# Patient Record
Sex: FEMALE | Race: WHITE | NOT HISPANIC OR LATINO | Employment: FULL TIME | URBAN - METROPOLITAN AREA
[De-identification: names, ages, dates, MRNs, and addresses within clinical notes are randomized per-mention and may not be internally consistent; named-entity substitution may affect disease eponyms.]

---

## 2020-09-15 ENCOUNTER — NURSE TRIAGE (OUTPATIENT)
Dept: OTHER | Facility: OTHER | Age: 30
End: 2020-09-15

## 2020-09-15 DIAGNOSIS — J12.81 SARS (SEVERE ACUTE RESPIRATORY SYNDROME): Primary | ICD-10-CM

## 2020-09-15 NOTE — TELEPHONE ENCOUNTER
Reason for Disposition   [1] COVID-19 EXPOSURE (Close Contact) AND [2] within last 14 days BUT [3] NO symptoms    Answer Assessment - Initial Assessment Questions  1  CLOSE CONTACT: "Who is the person with the confirmed or suspected COVID-19 infection that you were exposed to?"      Co worker  2  PLACE of CONTACT: "Where were you when you were exposed to COVID-19?" (e g , home, school, medical waiting room; which city?)     [facility]  3  TYPE of CONTACT: "How much contact was there?" (e g , sitting next to, live in same house, work in same office, same building)      unsure  4  DURATION of CONTACT: "How long were you in contact with the COVID-19 patient?" (e g , a few seconds, passed by person, a few minutes, live with the patient)      8 hrs  5  DATE of CONTACT: "When did you have contact with a COVID-19 patient?" (e g , how many days ago)      9/8  6  TRAVEL: "Have you traveled out of the country recently?" If so, "When and where?"      * Also ask about out-of-state travel, since the CDC has identified some high-risk cities for community spread in the [address]  * Note: Travel becomes less relevant if there is widespread community transmission where the patient lives  no  7  COMMUNITY SPREAD: "Are there lots of cases of COVID-19 (community spread) where you live?" (See public health department website, if unsure)        Yes  8  SYMPTOMS: "Do you have any symptoms?" (e g , fever, cough, breathing difficulty)      headache  9  PREGNANCY OR POSTPARTUM: "Is there any chance you are pregnant?" "When was your last menstrual period?" "Did you deliver in the last 2 weeks?"      No, 8/20/20  10  HIGH RISK: "Do you have any heart or lung problems?  Do you have a weak immune system?" (e g , CHF, COPD, asthma, HIV positive, chemotherapy, renal failure, diabetes mellitus, sickle cell anemia)        no    Protocols used: CORONAVIRUS (COVID-19) EXPOSURE-ADULT-  pt had exposure to covid and wants to be tested , no symptoms, care advice given and agreed upon   Order placed

## 2020-09-15 NOTE — TELEPHONE ENCOUNTER
Regarding: [id number]   ----- Message from Rosendo Nagel sent at 9/15/2020  7:18 PM EDT -----  possible exposure Positive Covid-19 Coworker wants to get tested please call cell number

## 2021-01-01 ENCOUNTER — NURSE TRIAGE (OUTPATIENT)
Dept: OTHER | Facility: OTHER | Age: 31
End: 2021-01-01

## 2021-01-01 DIAGNOSIS — Z20.822 EXPOSURE TO COVID-19 VIRUS: Primary | ICD-10-CM

## 2021-01-01 DIAGNOSIS — Z20.822 EXPOSURE TO COVID-19 VIRUS: ICD-10-CM

## 2021-01-01 PROCEDURE — U0003 INFECTIOUS AGENT DETECTION BY NUCLEIC ACID (DNA OR RNA); SEVERE ACUTE RESPIRATORY SYNDROME CORONAVIRUS 2 (SARS-COV-2) (CORONAVIRUS DISEASE [COVID-19]), AMPLIFIED PROBE TECHNIQUE, MAKING USE OF HIGH THROUGHPUT TECHNOLOGIES AS DESCRIBED BY CMS-2020-01-R: HCPCS | Performed by: FAMILY MEDICINE

## 2021-01-01 NOTE — TELEPHONE ENCOUNTER
Regarding: forgot to order lab test  ----- Message from Carmelo Shepard RN sent at 1/1/2021 12:58 AM EST -----  Covid

## 2021-01-01 NOTE — TELEPHONE ENCOUNTER
Regarding: Covid, Asymptomatic, Exposure  ----- Message from Ankur Sood sent at 12/31/2020  9:22 PM EST -----  I was in direct contact with someone who tested positive for covid  I don't have any symptoms

## 2021-01-01 NOTE — TELEPHONE ENCOUNTER
Reason for Disposition   [1] CLOSE CONTACT COVID-19 EXPOSURE within last 14 days AND [2] NO symptoms    Answer Assessment - Initial Assessment Questions  1  COVID-19 CLOSE CONTACT: "Who is the person with the confirmed or suspected COVID-19 infection that you were exposed to?"      Family member  2  PLACE of CONTACT: "Where were you when you were exposed to COVID-19?" (e g , home, school, medical waiting room; which city?)      There Home  3  TYPE of CONTACT: "How much contact was there?" (e g , sitting next to, live in same house, work in same office, same building)      Sitting next to  4  DURATION of CONTACT: "How long were you in contact with the COVID-19 patient?" (e g , a few seconds, passed by person, a few minutes, 15 minutes or longer, live with the patient)      Couple of hours  5  MASK: "Were you wearing a mask?" "Was the other person wearing a mask?" Note: wearing a mask reduces the risk of an   otherwise close contact  Yes  6  DATE of CONTACT: "When did you have contact with a COVID-19 patient?" (e g , how many days ago)      12/25/2020  7  COMMUNITY SPREAD: "Are there lots of cases of COVID-19 (community spread) where you live?" (See public health department website, if unsure)        yes  8  SYMPTOMS: "Do you have any symptoms?" (e g , fever, cough, breathing difficulty, loss of taste or smell)      no  9  PREGNANCY OR POSTPARTUM: "Is there any chance you are pregnant?" "When was your last menstrual period?" "Did you deliver in the last 2 weeks?"      no  10  HIGH RISK: "Do you have any heart or lung problems? Do you have a weak immune system?" (e g , heart failure, COPD, asthma, HIV positive, chemotherapy, renal failure, diabetes mellitus, sickle cell anemia, obesity)        no  11  TRAVEL: "Have you traveled out of the country recently?" If so, "When and where?"  Also ask about out-of-state travel, since the CDC has identified some high-risk cities for community spread in the [address] [place] [address] [address]    Note: Travel becomes less relevant if there is widespread community transmission where the patient lives          no    Protocols used: CORONAVIRUS (COVID-19) EXPOSURE-ADULT-AH

## 2021-01-03 LAB — SARS-COV-2 RNA SPEC QL NAA+PROBE: NOT DETECTED

## 2021-05-05 ENCOUNTER — OFFICE VISIT (OUTPATIENT)
Dept: OBGYN CLINIC | Facility: CLINIC | Age: 31
End: 2021-05-05
Payer: COMMERCIAL

## 2021-05-05 VITALS
DIASTOLIC BLOOD PRESSURE: 70 MMHG | WEIGHT: 215.8 LBS | HEIGHT: 66 IN | BODY MASS INDEX: 34.68 KG/M2 | SYSTOLIC BLOOD PRESSURE: 110 MMHG

## 2021-05-05 DIAGNOSIS — Z97.5 IUD (INTRAUTERINE DEVICE) IN PLACE: ICD-10-CM

## 2021-05-05 DIAGNOSIS — R10.2 PELVIC CRAMPING: ICD-10-CM

## 2021-05-05 DIAGNOSIS — N92.6 IRREGULAR BLEEDING: Primary | ICD-10-CM

## 2021-05-05 LAB — SL AMB POCT URINE HCG: NEGATIVE

## 2021-05-05 PROCEDURE — 81025 URINE PREGNANCY TEST: CPT | Performed by: NURSE PRACTITIONER

## 2021-05-05 PROCEDURE — 99212 OFFICE O/P EST SF 10 MIN: CPT | Performed by: NURSE PRACTITIONER

## 2021-05-05 NOTE — PROGRESS NOTES
Assessment/Plan:  IUD appears in proper position  US ordered to verify placement Preg test neg       Diagnoses and all orders for this visit:    Irregular bleeding  -     US pelvis complete w transvaginal; Future  -     POCT urine HCG    IUD (intrauterine device) in place  -     US pelvis complete w transvaginal; Future    Pelvic cramping  -     US pelvis complete w transvaginal; Future    Other orders  -     Multiple Vitamins-Minerals (MULTIVITAMIN GUMMIES ADULT PO); Take 1 tablet by mouth daily  -     Levonorgestrel (LILETTA, 52 MG, IU); by Intrauterine route          Subjective:      Patient ID: Favian Gutierrez is a 31 y o  female  Pt has Liletta inserted 11/18/2020  Did well no issues  Strings initially felt higher up and then didn't feel at all  Now able to feel strings again and seem further down the vagina  She has had some deeper cramping/achiness  Was very strong yesterday  She notes sha had intermittent spotting since inserted more days then not but has not had spotting recently  Not enough for Tampon  Concerned about placement and/or poss pregnancy  The following portions of the patient's history were reviewed and updated as appropriate: allergies, current medications, past family history, past medical history, past social history, past surgical history and problem list     Review of Systems   Gastrointestinal: Negative for constipation and diarrhea  Genitourinary: Positive for pelvic pain, vaginal bleeding and vaginal discharge  Negative for dysuria  Pelvic cramping, irreg spotting  Psychiatric/Behavioral: Negative for dysphoric mood  The patient is not nervous/anxious  All other systems reviewed and are negative  Objective:      /70 (BP Location: Left arm, Patient Position: Sitting, Cuff Size: Large)   Ht 5' 5 5" (1 664 m)   Wt 97 9 kg (215 lb 12 8 oz)   Breastfeeding No   BMI 35 36 kg/m²          Physical Exam  Vitals signs and nursing note reviewed  Constitutional:       General: She is not in acute distress  Appearance: Normal appearance  HENT:      Head: Normocephalic and atraumatic  Abdominal:      General: There is no distension  Palpations: Abdomen is soft  There is no mass  Tenderness: There is no abdominal tenderness  Genitourinary:     General: Normal vulva  Exam position: Lithotomy position  Labia:         Right: No rash or lesion  Left: No rash or lesion  Vagina: Normal  No vaginal discharge or erythema  Cervix: No cervical motion tenderness, discharge, lesion or cervical bleeding  Uterus: Not enlarged and not tender  Adnexa:         Right: No mass or tenderness  Left: No mass or tenderness  Comments: Strings noted in OS Appear normal    Lymphadenopathy:      Lower Body: No right inguinal adenopathy  No left inguinal adenopathy  Skin:     General: Skin is warm and dry  Neurological:      General: No focal deficit present  Mental Status: She is alert and oriented to person, place, and time  Psychiatric:         Mood and Affect: Mood normal          Behavior: Behavior normal          Thought Content:  Thought content normal

## 2021-11-18 ENCOUNTER — OFFICE VISIT (OUTPATIENT)
Dept: OBGYN CLINIC | Facility: CLINIC | Age: 31
End: 2021-11-18
Payer: COMMERCIAL

## 2021-11-18 VITALS — SYSTOLIC BLOOD PRESSURE: 112 MMHG | DIASTOLIC BLOOD PRESSURE: 76 MMHG | WEIGHT: 216 LBS | BODY MASS INDEX: 35.4 KG/M2

## 2021-11-18 DIAGNOSIS — Z97.5 IUD (INTRAUTERINE DEVICE) IN PLACE: ICD-10-CM

## 2021-11-18 DIAGNOSIS — N76.0 BV (BACTERIAL VAGINOSIS): ICD-10-CM

## 2021-11-18 DIAGNOSIS — Z20.2 POSSIBLE EXPOSURE TO STD: ICD-10-CM

## 2021-11-18 DIAGNOSIS — N89.8 VAGINAL DISCHARGE: Primary | ICD-10-CM

## 2021-11-18 DIAGNOSIS — B37.3 VAGINAL YEAST INFECTION: ICD-10-CM

## 2021-11-18 DIAGNOSIS — B96.89 BV (BACTERIAL VAGINOSIS): ICD-10-CM

## 2021-11-18 LAB
BV WHIFF TEST VAG QL: ABNORMAL
CLUE CELLS SPEC QL WET PREP: ABNORMAL
PH SMN: 5.5 [PH]
SL AMB POCT WET MOUNT: ABNORMAL
T VAGINALIS VAG QL WET PREP: ABNORMAL
YEAST VAG QL WET PREP: ABNORMAL

## 2021-11-18 PROCEDURE — 99213 OFFICE O/P EST LOW 20 MIN: CPT | Performed by: NURSE PRACTITIONER

## 2021-11-18 PROCEDURE — 87210 SMEAR WET MOUNT SALINE/INK: CPT | Performed by: NURSE PRACTITIONER

## 2021-11-18 RX ORDER — METRONIDAZOLE 7.5 MG/G
1 GEL VAGINAL
Qty: 70 G | Refills: 1 | Status: SHIPPED | OUTPATIENT
Start: 2021-11-18 | End: 2021-11-23

## 2021-11-18 RX ORDER — FLUCONAZOLE 150 MG/1
150 TABLET ORAL ONCE
Qty: 2 TABLET | Refills: 0 | Status: SHIPPED | OUTPATIENT
Start: 2021-11-18 | End: 2021-11-18

## 2021-11-20 LAB
C TRACH RRNA SPEC QL NAA+PROBE: NEGATIVE
N GONORRHOEA RRNA SPEC QL NAA+PROBE: NEGATIVE
T VAGINALIS RRNA SPEC QL NAA+PROBE: NEGATIVE

## 2022-03-07 ENCOUNTER — ANNUAL EXAM (OUTPATIENT)
Dept: OBGYN CLINIC | Facility: CLINIC | Age: 32
End: 2022-03-07
Payer: COMMERCIAL

## 2022-03-07 VITALS
WEIGHT: 218 LBS | DIASTOLIC BLOOD PRESSURE: 70 MMHG | BODY MASS INDEX: 35.03 KG/M2 | SYSTOLIC BLOOD PRESSURE: 110 MMHG | HEIGHT: 66 IN

## 2022-03-07 DIAGNOSIS — Z12.4 SCREENING FOR CERVICAL CANCER: ICD-10-CM

## 2022-03-07 DIAGNOSIS — Z01.419 ENCNTR FOR GYN EXAM (GENERAL) (ROUTINE) W/O ABN FINDINGS: Primary | ICD-10-CM

## 2022-03-07 PROCEDURE — 99395 PREV VISIT EST AGE 18-39: CPT | Performed by: OBSTETRICS & GYNECOLOGY

## 2022-03-07 NOTE — PROGRESS NOTES
Annual Wellness Visit  [address], [address], [facility], [address] [facility] [address] [facility]    ASSESSMENT/PLAN: Kinga Recio is a 27 y o  No obstetric history on file  who presents for annual gynecologic exam     Encounter for routine gynecologic examination  - Routine well woman exam completed today  - Cervical Cancer Screening: Current ASCCP Guidelines reviewed  Last Pap: Not on file   Next Pap Due: today, routine   - HPV Vaccination status: Immunization series complete  - STI screening offered including HIV testing: offered, pt declined  - Contraceptive counseling discussed  Current contraception: IUD, Veleta Jessica inserted 11/18/2020  - The following were reviewed in today's visit: breast self exam    Additional problems addressed during this visit:  1  Encntr for gyn exam (general) (routine) w/o abn findings  -     IGP, Aptima HPV, Rfx 16/18,45    2  Screening for cervical cancer  -     IGP, Aptima HPV, Rfx 16/18,45        Next visit: 1 yr      CC:  Annual Gynecologic Examination    HPI: Kinga Recio is a 27 y o  No obstetric history on file  who presents for annual gynecologic examination  Patient presents for Gyn exam   Denies having any concerns      Gyn History  No LMP recorded  Patient has had an implant  Last Pap: Not on file was normal, per patient    She  reports being sexually active and has had partner(s) who are male  She reports using the following method of birth control/protection: I U D        OB History  No obstetric history on file  Past Medical History:  No date: Brain injury [facility])      Comment:  per [facility]  No date: Headache     No past surgical history on file       Family History   Problem Relation Age of Onset    Breast cancer Maternal Grandmother     Liver cancer Paternal Grandfather     Colon cancer Maternal Aunt         Social History     Tobacco Use    Smoking status: Never Smoker    Smokeless tobacco: Never Used   Vaping Use    Vaping Use: Never used   Substance Use Topics    Alcohol use: Yes     Comment: socially    Drug use: Never     Comment: No use          Current Outpatient Medications:     Levonorgestrel (LILETTA, 52 MG, IU), by Intrauterine route, Disp: , Rfl:     Multiple Vitamins-Minerals (MULTIVITAMIN GUMMIES ADULT PO), Take 1 tablet by mouth daily, Disp: , Rfl:     She is allergic to sulfa antibiotics       ROS negative except as noted in HPI    Objective:  /70 (BP Location: Left arm, Patient Position: Sitting, Cuff Size: Standard)   Ht 5' 5 5" (1 664 m)   Wt 98 9 kg (218 lb)   BMI 35 73 kg/m²      Physical Exam  Vitals and nursing note reviewed  HENT:      Head: Normocephalic  Chest:   Breasts: Breasts are symmetrical       Right: Normal  No bleeding, mass, nipple discharge, skin change, tenderness or axillary adenopathy  Left: Normal  No bleeding, mass, nipple discharge, skin change, tenderness or axillary adenopathy  Abdominal:      General: There is no distension  Palpations: Abdomen is soft  There is no mass  Tenderness: There is no abdominal tenderness  There is no rebound  Genitourinary:     General: Normal vulva  Exam position: Lithotomy position  Labia:         Right: No rash, tenderness or lesion  Left: No rash, tenderness or lesion  Urethra: No urethral pain or urethral lesion  Vagina: Normal  No vaginal discharge  Cervix: No discharge, friability, lesion or erythema  Uterus: Normal        Adnexa: Right adnexa normal and left adnexa normal       Rectum: No external hemorrhoid  Comments: IUD string visible in os  Musculoskeletal:      Right lower leg: No edema  Left lower leg: No edema  Lymphadenopathy:      Upper Body:      Right upper body: No axillary or pectoral adenopathy  Left upper body: No axillary or pectoral adenopathy  Skin:     General: Skin is warm     Neurological:      Mental Status: She is alert and oriented to person, place, and time  Psychiatric:         Mood and Affect: Mood normal          Behavior: Behavior normal          Thought Content:  Thought content normal

## 2022-03-12 LAB
CYTOLOGIST CVX/VAG CYTO: NORMAL
DX ICD CODE: NORMAL
HPV I/H RISK 4 DNA CVX QL PROBE+SIG AMP: NEGATIVE
OTHER STN SPEC: NORMAL
PATH REPORT.FINAL DX SPEC: NORMAL
SL AMB NOTE:: NORMAL
SL AMB SPECIMEN ADEQUACY: NORMAL
SL AMB TEST METHODOLOGY: NORMAL

## 2024-01-17 ENCOUNTER — ANNUAL EXAM (OUTPATIENT)
Dept: OBGYN CLINIC | Facility: CLINIC | Age: 34
End: 2024-01-17
Payer: COMMERCIAL

## 2024-01-17 VITALS
HEIGHT: 66 IN | DIASTOLIC BLOOD PRESSURE: 70 MMHG | WEIGHT: 229 LBS | BODY MASS INDEX: 36.8 KG/M2 | SYSTOLIC BLOOD PRESSURE: 114 MMHG

## 2024-01-17 DIAGNOSIS — Z01.419 ENCNTR FOR GYN EXAM (GENERAL) (ROUTINE) W/O ABN FINDINGS: Primary | ICD-10-CM

## 2024-01-17 DIAGNOSIS — Z30.431 IUD CHECK UP: ICD-10-CM

## 2024-01-17 DIAGNOSIS — K64.4 EXTERNAL HEMORRHOID: ICD-10-CM

## 2024-01-17 DIAGNOSIS — Z87.42 HISTORY OF PCOS: ICD-10-CM

## 2024-01-17 PROCEDURE — S0612 ANNUAL GYNECOLOGICAL EXAMINA: HCPCS | Performed by: OBSTETRICS & GYNECOLOGY

## 2024-01-17 NOTE — PROGRESS NOTES
"Annual Wellness Visit  [facility] - [address], [address], [place] [address]    ASSESSMENT/PLAN: Monica Barksdale is a 33 y.o. No obstetric history on file. who presents for annual gynecologic exam.    Encounter for routine gynecologic examination  - Routine well woman exam completed today.  - Cervical Cancer Screening: Current ASCCP Guidelines reviewed. Last Pap: 03/07/2022 . Next Pap Due: before 2027, routine.  - HPV Vaccination status: Immunization series complete  - STI screening offered including HIV testing: offered, pt declined  - Contraceptive counseling discussed.  Current contraception: IUD, Liletta inserted 11/18/2020  - The following were reviewed in today's visit: breast self exam    Additional problems addressed during this visit:  1. Encntr for gyn exam (general) (routine) w/o abn findings    2. External hemorrhoid      -  informed patient of \"skin tag\" being external hemorrhoid.  Management options reviewed.      3. IUD check up     -  IUD string visible    4. History of PCOS    Next visit: 1 yr    CC:  Annual Gynecologic Examination    HPI: Monica Barksdale is a 33 y.o. No obstetric history on file. who presents for annual gynecologic examination.  Patient presents for Gyn exam.  Has light spotting at time of menses since Liletta inserted on 11/18/2020.  Patient concerned about a skin tag at rectum that has bee present for a while.          Gyn History  No LMP recorded. Patient has had an implant.     Last Pap: 03/07/2022 was normal    She  reports being sexually active and has had partner(s) who are male. She reports using the following methods of birth control/protection: Condom Male and I.U.D..       OB History  No obstetric history on file.    Past Medical History:  No date: Brain injury ([facility]CC)      Comment:  per [facility]  No date: Headache  No date: Polycystic ovary syndrome      Comment:  Diagnosed by dr. Sosa a few years ago.     No past surgical " "history on file.     Family History   Problem Relation Age of Onset    Breast cancer Maternal Grandmother     Diabetes Maternal Grandmother     Heart attack Maternal Grandmother     Liver cancer Paternal Grandfather     Colon cancer Maternal Aunt     Diabetes Mother     Thyroid disease Mother     Diabetes Maternal Grandfather     Colon cancer Paternal Grandmother         Social History     Tobacco Use    Smoking status: Never    Smokeless tobacco: Never   Vaping Use    Vaping status: Never Used   Substance Use Topics    Alcohol use: Yes     Comment: Occassional    Drug use: Never     Comment: No use          Current Outpatient Medications:     Levonorgestrel (LILETTA, 52 MG, IU), by Intrauterine route, Disp: , Rfl:     Multiple Vitamins-Minerals (MULTIVITAMIN GUMMIES ADULT PO), Take 1 tablet by mouth daily, Disp: , Rfl:     She is allergic to sulfa antibiotics..    ROS negative except as noted in HPI    Objective:  /70 (BP Location: Right arm, Patient Position: Sitting, Cuff Size: Standard)   Ht 5' 6\" (1.676 m)   Wt 104 kg (229 lb)   BMI 36.96 kg/m²      Physical Exam  Vitals and nursing note reviewed.   HENT:      Head: Normocephalic.   Chest:   Breasts:     Breasts are symmetrical.      Right: Normal. No bleeding, mass, nipple discharge, skin change or tenderness.      Left: Normal. No bleeding, mass, nipple discharge, skin change or tenderness.   Abdominal:      General: There is no distension.      Palpations: Abdomen is soft. There is no mass.      Tenderness: There is no abdominal tenderness. There is no rebound.   Genitourinary:     General: Normal vulva.      Exam position: Lithotomy position.      Labia:         Right: No rash, tenderness or lesion.         Left: No rash, tenderness or lesion.       Urethra: No urethral pain or urethral lesion.      Vagina: Normal. No vaginal discharge.      Cervix: No discharge, friability, lesion or erythema.      Uterus: Normal.       Adnexa: Right adnexa normal " and left adnexa normal.      Rectum: External hemorrhoid present.   Musculoskeletal:      Right lower leg: No edema.      Left lower leg: No edema.   Lymphadenopathy:      Upper Body:      Right upper body: No axillary or pectoral adenopathy.      Left upper body: No axillary or pectoral adenopathy.   Skin:     General: Skin is warm.   Neurological:      Mental Status: She is alert and oriented to person, place, and time.   Psychiatric:         Mood and Affect: Mood normal.         Behavior: Behavior normal.         Thought Content: Thought content normal.

## 2024-11-14 ENCOUNTER — TELEPHONE (OUTPATIENT)
Age: 34
End: 2024-11-14

## 2024-11-14 NOTE — TELEPHONE ENCOUNTER
Patient called in stating she is concerned about the reproductive rights changes that may take effect in January and wanted to know about having her IUD removed early and reinserted as it is due for removal in 1-2 years. Currently has the Liletta- inserted 11/2020. Please advise.